# Patient Record
Sex: MALE | Race: WHITE | NOT HISPANIC OR LATINO | Employment: OTHER | URBAN - METROPOLITAN AREA
[De-identification: names, ages, dates, MRNs, and addresses within clinical notes are randomized per-mention and may not be internally consistent; named-entity substitution may affect disease eponyms.]

---

## 2023-04-14 LAB
ALANINE AMINOTRANSFERASE (SGPT) (U/L) IN SER/PLAS: 16 U/L (ref 10–52)
ALBUMIN (G/DL) IN SER/PLAS: 4.3 G/DL (ref 3.4–5)
ALKALINE PHOSPHATASE (U/L) IN SER/PLAS: 42 U/L (ref 33–136)
ANION GAP IN SER/PLAS: 10 MMOL/L (ref 10–20)
ASPARTATE AMINOTRANSFERASE (SGOT) (U/L) IN SER/PLAS: 21 U/L (ref 9–39)
BILIRUBIN TOTAL (MG/DL) IN SER/PLAS: 1.1 MG/DL (ref 0–1.2)
CALCIUM (MG/DL) IN SER/PLAS: 9.4 MG/DL (ref 8.6–10.3)
CARBON DIOXIDE, TOTAL (MMOL/L) IN SER/PLAS: 29 MMOL/L (ref 21–32)
CHLORIDE (MMOL/L) IN SER/PLAS: 104 MMOL/L (ref 98–107)
CHOLESTEROL (MG/DL) IN SER/PLAS: 115 MG/DL (ref 0–199)
CHOLESTEROL IN HDL (MG/DL) IN SER/PLAS: 48.7 MG/DL
CHOLESTEROL/HDL RATIO: 2.4
CREATININE (MG/DL) IN SER/PLAS: 0.99 MG/DL (ref 0.5–1.3)
GFR MALE: 81 ML/MIN/1.73M2
GLUCOSE (MG/DL) IN SER/PLAS: 90 MG/DL (ref 74–99)
LDL: 51 MG/DL (ref 0–99)
POTASSIUM (MMOL/L) IN SER/PLAS: 3.8 MMOL/L (ref 3.5–5.3)
PROTEIN TOTAL: 6.9 G/DL (ref 6.4–8.2)
SODIUM (MMOL/L) IN SER/PLAS: 139 MMOL/L (ref 136–145)
TRIGLYCERIDE (MG/DL) IN SER/PLAS: 79 MG/DL (ref 0–149)
UREA NITROGEN (MG/DL) IN SER/PLAS: 18 MG/DL (ref 6–23)
VLDL: 16 MG/DL (ref 0–40)

## 2024-01-25 PROBLEM — E78.5 DYSLIPIDEMIA (HIGH LDL; LOW HDL): Status: ACTIVE | Noted: 2024-01-25

## 2024-01-25 PROBLEM — R00.0 SINUS TACHYCARDIA: Status: ACTIVE | Noted: 2024-01-25

## 2024-01-25 PROBLEM — I25.10 CAD (CORONARY ARTERY DISEASE), NATIVE CORONARY ARTERY: Status: ACTIVE | Noted: 2024-01-25

## 2024-01-25 PROBLEM — I65.23 BILATERAL CAROTID ARTERY STENOSIS: Status: ACTIVE | Noted: 2024-01-25

## 2024-01-25 PROBLEM — J45.909 ASTHMA (HHS-HCC): Status: ACTIVE | Noted: 2024-01-25

## 2024-01-25 PROBLEM — E78.5 HYPERLIPIDEMIA: Status: ACTIVE | Noted: 2024-01-25

## 2024-01-25 PROBLEM — Z86.79 HISTORY OF VENTRICULAR TACHYCARDIA: Status: ACTIVE | Noted: 2023-05-22

## 2024-01-25 PROBLEM — Z95.1 HISTORY OF CORONARY ARTERY BYPASS SURGERY: Status: ACTIVE | Noted: 2024-01-25

## 2024-01-25 RX ORDER — NAPROXEN SODIUM 220 MG/1
81 TABLET, FILM COATED ORAL DAILY
COMMUNITY

## 2024-01-25 RX ORDER — NITROGLYCERIN 0.4 MG/1
0.4 TABLET SUBLINGUAL EVERY 5 MIN PRN
COMMUNITY
End: 2024-05-02 | Stop reason: SDUPTHER

## 2024-01-25 RX ORDER — MULTIVITAMIN
1 TABLET ORAL DAILY
COMMUNITY
Start: 2021-05-17

## 2024-01-25 RX ORDER — CICLOPIROX 80 MG/ML
1 SOLUTION TOPICAL NIGHTLY
COMMUNITY
Start: 2022-01-19

## 2024-01-25 RX ORDER — ATENOLOL 25 MG/1
12.5 TABLET ORAL DAILY
COMMUNITY
End: 2024-05-02 | Stop reason: SDUPTHER

## 2024-01-25 RX ORDER — LANOLIN ALCOHOL/MO/W.PET/CERES
400 CREAM (GRAM) TOPICAL DAILY
COMMUNITY
Start: 2021-05-17

## 2024-01-25 RX ORDER — KETOCONAZOLE 20 MG/ML
1 SHAMPOO, SUSPENSION TOPICAL
COMMUNITY
Start: 2021-12-31

## 2024-01-25 RX ORDER — ATORVASTATIN CALCIUM 20 MG/1
1 TABLET, FILM COATED ORAL DAILY
COMMUNITY
Start: 2021-05-17 | End: 2024-05-02 | Stop reason: SDUPTHER

## 2024-01-25 RX ORDER — ACETAMINOPHEN 160 MG/5ML
200 SUSPENSION, ORAL (FINAL DOSE FORM) ORAL DAILY
COMMUNITY
Start: 2021-05-17

## 2024-04-23 ENCOUNTER — HOSPITAL ENCOUNTER (OUTPATIENT)
Dept: RADIOLOGY | Facility: CLINIC | Age: 74
Discharge: HOME | End: 2024-04-23
Payer: MEDICARE

## 2024-04-23 ENCOUNTER — HOSPITAL ENCOUNTER (OUTPATIENT)
Dept: CARDIOLOGY | Facility: CLINIC | Age: 74
Discharge: HOME | End: 2024-04-23
Payer: MEDICARE

## 2024-04-23 DIAGNOSIS — I25.10 ATHEROSCLEROTIC HEART DISEASE OF NATIVE CORONARY ARTERY WITHOUT ANGINA PECTORIS: ICD-10-CM

## 2024-04-23 DIAGNOSIS — Z95.1 PRESENCE OF AORTOCORONARY BYPASS GRAFT: ICD-10-CM

## 2024-04-23 PROCEDURE — 3430000001 HC RX 343 DIAGNOSTIC RADIOPHARMACEUTICALS: Performed by: INTERNAL MEDICINE

## 2024-04-23 PROCEDURE — A9502 TC99M TETROFOSMIN: HCPCS | Performed by: INTERNAL MEDICINE

## 2024-04-23 PROCEDURE — 93017 CV STRESS TEST TRACING ONLY: CPT

## 2024-04-23 PROCEDURE — 78452 HT MUSCLE IMAGE SPECT MULT: CPT

## 2024-04-23 RX ORDER — ACETAMINOPHEN 500 MG
2000 TABLET ORAL DAILY
COMMUNITY

## 2024-04-23 RX ADMIN — TETROFOSMIN 36 MILLICURIE: 0.23 INJECTION, POWDER, LYOPHILIZED, FOR SOLUTION INTRAVENOUS at 09:24

## 2024-04-23 RX ADMIN — TETROFOSMIN 10.9 MILLICURIE: 0.23 INJECTION, POWDER, LYOPHILIZED, FOR SOLUTION INTRAVENOUS at 08:02

## 2024-05-02 ENCOUNTER — OFFICE VISIT (OUTPATIENT)
Dept: CARDIOLOGY | Facility: CLINIC | Age: 74
End: 2024-05-02
Payer: MEDICARE

## 2024-05-02 VITALS
WEIGHT: 141.9 LBS | HEIGHT: 70 IN | DIASTOLIC BLOOD PRESSURE: 60 MMHG | HEART RATE: 60 BPM | SYSTOLIC BLOOD PRESSURE: 100 MMHG | BODY MASS INDEX: 20.31 KG/M2

## 2024-05-02 DIAGNOSIS — Z78.9 NEVER SMOKED TOBACCO: ICD-10-CM

## 2024-05-02 DIAGNOSIS — I25.10 CORONARY ARTERY DISEASE INVOLVING NATIVE CORONARY ARTERY OF NATIVE HEART WITHOUT ANGINA PECTORIS: ICD-10-CM

## 2024-05-02 DIAGNOSIS — I65.23 BILATERAL CAROTID ARTERY STENOSIS: ICD-10-CM

## 2024-05-02 DIAGNOSIS — R00.0 SINUS TACHYCARDIA: ICD-10-CM

## 2024-05-02 DIAGNOSIS — E78.5 DYSLIPIDEMIA (HIGH LDL; LOW HDL): ICD-10-CM

## 2024-05-02 DIAGNOSIS — Z95.1 HISTORY OF CORONARY ARTERY BYPASS SURGERY: ICD-10-CM

## 2024-05-02 PROCEDURE — 3008F BODY MASS INDEX DOCD: CPT | Performed by: INTERNAL MEDICINE

## 2024-05-02 PROCEDURE — 1036F TOBACCO NON-USER: CPT | Performed by: INTERNAL MEDICINE

## 2024-05-02 PROCEDURE — 99214 OFFICE O/P EST MOD 30 MIN: CPT | Performed by: INTERNAL MEDICINE

## 2024-05-02 PROCEDURE — 1159F MED LIST DOCD IN RCRD: CPT | Performed by: INTERNAL MEDICINE

## 2024-05-02 PROCEDURE — 1157F ADVNC CARE PLAN IN RCRD: CPT | Performed by: INTERNAL MEDICINE

## 2024-05-02 RX ORDER — NITROGLYCERIN 0.4 MG/1
0.4 TABLET SUBLINGUAL EVERY 5 MIN PRN
Qty: 25 TABLET | Refills: 1 | Status: SHIPPED | OUTPATIENT
Start: 2024-05-02

## 2024-05-02 RX ORDER — ATENOLOL 25 MG/1
12.5 TABLET ORAL DAILY
Qty: 45 TABLET | Refills: 3 | Status: SHIPPED | OUTPATIENT
Start: 2024-05-02 | End: 2025-05-02

## 2024-05-02 RX ORDER — ATORVASTATIN CALCIUM 20 MG/1
20 TABLET, FILM COATED ORAL DAILY
Qty: 90 TABLET | Refills: 3 | Status: SHIPPED | OUTPATIENT
Start: 2024-05-02 | End: 2025-05-02

## 2024-05-02 NOTE — PROGRESS NOTES
CARDIOLOGY OFFICE VISIT      CHIEF COMPLAINT  Chief Complaint   Patient presents with    Follow-up     1 YEAR FOLLOW UP AND TO REVIEW STRESS TESTING RESULTS        HISTORY OF PRESENT ILLNESS   The patient is a 73-year-old  male with past medical history significant for coronary artery disease, status post CABG, 2006, who presents for followup cardiovascular care.     Patient has a rare chest pain described as a thump in his chest or sharp sensation described as mild lasting 1 minute in duration unrelated to activity.  Denies dyspnea exertion.  Occasional palpitations that wakes him from sleep.  He feels an irregular extra beat and then a pause.  Denies nausea, vomiting, dizziness, near-syncope, savita syncope.  Occasional lightheadedness.  Patient exercises 30 minutes doing floor exercises squatting, leg kicks, lunges.  Patient also takes walks and occasionally runs. His home Kardia monitor had a few episodes that suggested possible atrial fibrillation.  I reviewed the rhythm strips and they revealed normal sinus rhythm.          REVIEW OF SYSTEMS: Negative, noncontributory or as previously mentioned x 12 systems.     PAST MEDICAL HISTORY   1. As above plus asthma  2. Seborrhea dermatitis.  3. Tendonitis.  4. Shoulder bone spur.     PAST SURGICAL HISTORY  1. Coronary artery bypass graft x4.  2. Ear surgery in .     SOCIAL HISTORY: Drinks occasionally two to three drinks per week. Denies  tobacco use.     FAMILY HISTORY: Father  at 89 with CVA. Mom alive at 97 with history of  pacemaker implantation, congestive heart failure and pericarditis.     ASSESSMENT:   Coronary artery disease, status post coronary artery bypass graft x4, 2006.  Hyperlipidemia.  History of frequent PVCs, wide-complex tachycardia/ventricular tachycardia versus supraventricular tachycardia with aberrancy during stress test.  Carotid artery stenosis.  Asthma.  Possible Raynaud's.  BPH- [facility]      RECOMMENDATIONS:   The patient appears to be stable from a cardiac standpoint. He has no symptoms of angina. No ischemia on stress test achieving 13.4 METS. I advised the patient if he has recurrent prolonged palpitations to take an additional tablet of magnesium oxide and metoprolol. Use as needed Kardia monitor. Blood pressure is under good control. Lipid profile is within acceptable limits. At this time, we will continue medical therapy as prescribed. Follow up in one year. We will obtain CMP, lipid profile at that time.        Please excuse any errors in grammar or translation related to this dictation. Voice recognition software was utilized to prepare this document    Recent Cardiovascular Testing:  The following results have been reviewed and updated.   MPX: 4/23/24  1. Normal.  2. EF 68%.  3.13.4 METS     CRD: 5/13/2008   1. Mild carotid disease.     Cardiac MRI: 5/12/2008   1. 1+ Tr, 1+ MR  2. EF 61%.   3. No CMR evidence of pericarditis or myocarditis.   4. Delayed enhancement imaging was normal.     Labs: 4/14/24  ,HDL 52, LDL 54, Trig  50     Abd Aortic u/s: 7/10/12  1. Normal abdominal aorta.       VITALS  Vitals:    05/02/24 0843   BP: 100/60   Pulse: 60     Wt Readings from Last 4 Encounters:   05/02/24 64.4 kg (141 lb 14.4 oz)   04/27/23 66.2 kg (146 lb)   04/28/22 66.5 kg (146 lb 9 oz)       PHYSICAL EXAM:  GENERAL:  Well developed, well nourished, in no acute distress.  CHEST:  Symmetric and nontender.  NEURO/PSYCH:  Alert and oriented times three with approppriate behavior and responses.  NECK:  Supple, no JVD, no bruit.  LUNGS:  Clear to auscultation bilaterally, normal respiratory effort.  HEART:  Rate and rhythm REGULAR with no evident murmur, no gallop appreciated.    There are no rubs, clicks or heaves.  EXTREMITIES:  Warm with good color, no clubbing or cyanosis.  There is no edema noted.  PERIPHERAL VASCULAR:  Pulses present and equally palpable; 2+ throughout.    ASSESSMENT AND  PLAN  Problem List Items Addressed This Visit          Cardiac and Vasculature    Bilateral carotid artery stenosis    CAD (coronary artery disease), native coronary artery    Relevant Medications    atenolol (Tenormin) 25 mg tablet    nitroglycerin (Nitrostat) 0.4 mg SL tablet    Other Relevant Orders    Comprehensive metabolic panel    Dyslipidemia (high LDL; low HDL)    Relevant Medications    atorvastatin (Lipitor) 20 mg tablet    Other Relevant Orders    Lipid panel    History of coronary artery bypass surgery    Sinus tachycardia       Endocrine/Metabolic    BMI 20.0-20.9, adult       Tobacco    Never smoked tobacco       Past Medical History  No past medical history on file.    Social History  Social History     Tobacco Use    Smoking status: Never    Smokeless tobacco: Never   Substance Use Topics    Alcohol use: Yes     Comment: 4-5X A WEEK    Drug use: Never       Family History     Family History   Problem Relation Name Age of Onset    Heart failure Mother      Other (Pacemaker insertion) Mother      Other (Pericarditis) Mother      Cardiomyopathy Mother      Hypertension Father      Stroke Father  84    Transient ischemic attack Other          Allergies:  Allergies   Allergen Reactions    Monosodium Glutamate Hives        Outpatient Medications:  Current Outpatient Medications   Medication Instructions    aspirin 81 mg, oral, Daily    atenolol (TENORMIN) 12.5 mg, oral, Daily    atorvastatin (Lipitor) 20 mg tablet 1 tablet, oral, Daily    cholecalciferol (VITAMIN D3) 2,000 Units, oral, Daily    ciclopirox (Penlac) 8 % solution 1 Application, Topical, Nightly    coenzyme Q-10 200 mg, oral, Daily    fish oil concentrate (Omega-3) 120-180 mg capsule 1 capsule, oral, Daily    ketoconazole (NIZOral) 2 % shampoo 1 Application, Topical, As directed    magnesium oxide (MAG-OX) 400 mg, oral, Daily    multivitamin tablet 1 tablet, oral, Daily    nitroglycerin (NITROSTAT) 0.4 mg, sublingual, Every 5 min PRN     "    Recent Lab Results:    CMP:    Lab Results   Component Value Date     04/14/2023    K 3.8 04/14/2023     04/14/2023    CO2 29 04/14/2023    BUN 18 04/14/2023    CREATININE 0.99 04/14/2023    GLUCOSE 90 04/14/2023    CALCIUM 9.4 04/14/2023       Magnesium:    No results found for: \"MG\"    Lipid Profile:    Lab Results   Component Value Date    TRIG 79 04/14/2023    HDL 48.7 04/14/2023       TSH:    No results found for: \"TSH\"    BNP:   No results found for: \"BNP\"     PT/INR:    No results found for: \"PROTIME\", \"INR\"    HgBA1c:    No results found for: \"HGBA1C\"    BMP:  Lab Results   Component Value Date     04/14/2023     04/22/2022     04/15/2021    K 3.8 04/14/2023    K 4.1 04/22/2022    K 4.4 04/15/2021     04/14/2023     04/22/2022     04/15/2021    CO2 29 04/14/2023    CO2 30 04/22/2022    CO2 28 04/15/2021    BUN 18 04/14/2023    BUN 16 04/22/2022    BUN 15 04/15/2021    CREATININE 0.99 04/14/2023    CREATININE 1.02 04/22/2022    CREATININE 0.98 04/15/2021       CBC:  Lab Results   Component Value Date    WBC 4.2 (L) 08/26/2019    RBC 4.29 (L) 08/26/2019    HGB 14.3 08/26/2019    HCT 43.1 08/26/2019     08/26/2019    MCHC 33.2 08/26/2019    RDW 12.6 08/26/2019     08/26/2019       Cardiac Enzymes:    No results found for: \"TROPHS\"    Hepatic Function Panel:    Lab Results   Component Value Date    ALKPHOS 42 04/14/2023    ALT 16 04/14/2023    AST 21 04/14/2023    PROT 6.9 04/14/2023    BILITOT 1.1 04/14/2023           Herb Andrade, DO        "

## 2025-05-08 ENCOUNTER — APPOINTMENT (OUTPATIENT)
Dept: CARDIOLOGY | Facility: CLINIC | Age: 75
End: 2025-05-08
Payer: MEDICARE

## 2025-05-08 VITALS
BODY MASS INDEX: 20.3 KG/M2 | HEIGHT: 71 IN | WEIGHT: 145 LBS | SYSTOLIC BLOOD PRESSURE: 90 MMHG | HEART RATE: 72 BPM | DIASTOLIC BLOOD PRESSURE: 60 MMHG

## 2025-05-08 DIAGNOSIS — I65.23 BILATERAL CAROTID ARTERY STENOSIS: ICD-10-CM

## 2025-05-08 DIAGNOSIS — N40.0 BENIGN PROSTATIC HYPERPLASIA, UNSPECIFIED WHETHER LOWER URINARY TRACT SYMPTOMS PRESENT: ICD-10-CM

## 2025-05-08 DIAGNOSIS — Z78.9 NEVER SMOKED TOBACCO: ICD-10-CM

## 2025-05-08 DIAGNOSIS — E78.5 DYSLIPIDEMIA (HIGH LDL; LOW HDL): Primary | ICD-10-CM

## 2025-05-08 DIAGNOSIS — I25.10 CORONARY ARTERY DISEASE INVOLVING NATIVE CORONARY ARTERY, UNSPECIFIED WHETHER ANGINA PRESENT, UNSPECIFIED WHETHER NATIVE OR TRANSPLANTED HEART: ICD-10-CM

## 2025-05-08 DIAGNOSIS — J45.909 ASTHMA, UNSPECIFIED ASTHMA SEVERITY, UNSPECIFIED WHETHER COMPLICATED, UNSPECIFIED WHETHER PERSISTENT (HHS-HCC): ICD-10-CM

## 2025-05-08 DIAGNOSIS — I25.10 CORONARY ARTERY DISEASE INVOLVING NATIVE CORONARY ARTERY OF NATIVE HEART WITHOUT ANGINA PECTORIS: ICD-10-CM

## 2025-05-08 DIAGNOSIS — I49.3 PVC (PREMATURE VENTRICULAR CONTRACTION): ICD-10-CM

## 2025-05-08 PROCEDURE — 1159F MED LIST DOCD IN RCRD: CPT | Performed by: INTERNAL MEDICINE

## 2025-05-08 PROCEDURE — 1157F ADVNC CARE PLAN IN RCRD: CPT | Performed by: INTERNAL MEDICINE

## 2025-05-08 PROCEDURE — 3008F BODY MASS INDEX DOCD: CPT | Performed by: INTERNAL MEDICINE

## 2025-05-08 PROCEDURE — 1036F TOBACCO NON-USER: CPT | Performed by: INTERNAL MEDICINE

## 2025-05-08 PROCEDURE — 99214 OFFICE O/P EST MOD 30 MIN: CPT | Performed by: INTERNAL MEDICINE

## 2025-05-08 RX ORDER — ATENOLOL 25 MG/1
12.5 TABLET ORAL DAILY
Qty: 45 TABLET | Refills: 3 | Status: SHIPPED | OUTPATIENT
Start: 2025-05-08 | End: 2026-05-08

## 2025-05-08 RX ORDER — ATORVASTATIN CALCIUM 20 MG/1
20 TABLET, FILM COATED ORAL DAILY
Qty: 90 TABLET | Refills: 3 | Status: SHIPPED | OUTPATIENT
Start: 2025-05-08 | End: 2026-05-08

## 2025-05-08 NOTE — PROGRESS NOTES
CARDIOLOGY OFFICE VISIT      CHIEF COMPLAINT  Chief Complaint   Patient presents with    Follow-up     Patient is Present for 1 year follow up for Coronary artery disease involving native coronary artery of native heart without angina pectoris        Hyperlipidemia  PVCs    HISTORY OF PRESENT ILLNESS  The patient is a 74-year-old  male with past medical history significant for coronary artery disease, status post CABG, 2006, who presents for followup cardiovascular care.     Patient reports he has been having issues with BPH.  He has declined surgery and has been concerned about initiating medical therapy prescribed by urology.  He has had no change in his functional status.  Patient has a rare chest pain described as a pressing sensation in his chest or sharp sensation described as mild lasting 1 minute in duration unrelated to activity.  Denies dyspnea exertion, palpitations.  Denies nausea, vomiting, dizziness, near-syncope, savita syncope.  Patient exercises 3 times a week 20-30 minutes.           REVIEW OF SYSTEMS: Negative, noncontributory or as previously mentioned x 12 systems.     PAST MEDICAL HISTORY   1. As above plus asthma  2. Seborrhea dermatitis.  3. Tendonitis.  4. Shoulder bone spur.     PAST SURGICAL HISTORY  1. Coronary artery bypass graft x4.  2. Ear surgery in .     SOCIAL HISTORY: Drinks occasionally two to three drinks per week. Denies  tobacco use.     FAMILY HISTORY: Father  at 89 with CVA. Mom alive at 97 with history of  pacemaker implantation, congestive heart failure and pericarditis.     ASSESSMENT:   Coronary artery disease, status post coronary artery bypass graft x4, 2006.  Hyperlipidemia.  History of frequent PVCs, wide-complex tachycardia/ventricular tachycardia versus supraventricular tachycardia with aberrancy during stress test.  Carotid artery stenosis.  Asthma.  Possible Raynaud's.  BPH- [facility]     RECOMMENDATIONS:   The patient  appears to be stable from a cardiac standpoint. He has no symptoms of angina. No ischemia on stress test achieving 13.4 METS.  No symptomatic arrhythmia.  No evidence of heart failure.  I advised the patient if he has recurrent prolonged palpitations to take an additional tablet of magnesium oxide and metoprolol. Use as needed Kardia monitor. Blood pressure is under good control. Lipid profile is within acceptable limits. At this time, we will continue medical therapy as prescribed. Follow up in one year. We will obtain CMP, lipid profile at that time.        Please excuse any errors in grammar or translation related to this dictation. Voice recognition software was utilized to prepare this document    Recent Cardiovascular Testing:  The following results have been reviewed and updated.   MPX: 4/23/24  1. Normal.  2. EF 68%.  3.13.4 METS     CRD: 5/13/2008   1. Mild carotid disease.     Cardiac MRI: 5/12/2008   1. 1+ Tr, 1+ MR  2. EF 61%.   3. No CMR evidence of pericarditis or myocarditis.   4. Delayed enhancement imaging was normal.     Labs: 4/17/25  , TG 74, HDL 50, LDL 68     Abd Aortic u/s: 7/10/12  1. Normal abdominal aorta.       VITALS  Vitals:    05/08/25 0852   BP: 90/60   Pulse: 72     Wt Readings from Last 4 Encounters:   05/08/25 65.8 kg (145 lb)   05/02/24 64.4 kg (141 lb 14.4 oz)   04/27/23 66.2 kg (146 lb)   04/28/22 66.5 kg (146 lb 9 oz)       PHYSICAL EXAM:  GENERAL:  Well developed, well nourished, in no acute distress.  CHEST:  Symmetric and nontender.  NEURO/PSYCH:  Alert and oriented times three with approppriate behavior and responses.  NECK:  Supple, no JVD, no bruit.  LUNGS:  Clear to auscultation bilaterally, normal respiratory effort.  HEART:  Rate and rhythm REGULAR with no evident murmur, no gallop appreciated.    There are no rubs, clicks or heaves.  EXTREMITIES:  Warm with good color, no clubbing or cyanosis.  There is no edema noted.  PERIPHERAL VASCULAR:  Pulses present and  equally palpable; 2+ throughout.    ASSESSMENT AND PLAN  Diagnoses and all orders for this visit:  Dyslipidemia (high LDL; low HDL)  -     atorvastatin (Lipitor) 20 mg tablet; Take 1 tablet (20 mg) by mouth once daily.  -     Follow Up In Cardiology; Future  -     Lipid panel; Future  Coronary artery disease involving native coronary artery, unspecified whether angina present, unspecified whether native or transplanted heart  -     Follow Up In Cardiology; Future  -     Comprehensive metabolic panel; Future  Bilateral carotid artery stenosis  -     Follow Up In Cardiology; Future  Asthma, unspecified asthma severity, unspecified whether complicated, unspecified whether persistent ([facility])  -     Follow Up In Cardiology; Future  PVC (premature ventricular contraction)  -     Follow Up In Cardiology; Future  Benign prostatic hyperplasia, unspecified whether lower urinary tract symptoms present  -     Follow Up In Cardiology; Future  Never smoked tobacco  -     Follow Up In Cardiology; Future  BMI 20.0-20.9, adult  -     Follow Up In Cardiology; Future  Coronary artery disease involving native coronary artery of native heart without angina pectoris  -     atenolol (Tenormin) 25 mg tablet; Take 0.5 tablets (12.5 mg) by mouth once daily.  -     Follow Up In Cardiology; Future      Past Medical History  Medical History[1]    Social History  Social History[2]    Family History   Family History[3]     Allergies:  RX Allergies[4]     Outpatient Medications:  Current Outpatient Medications   Medication Instructions    aspirin 81 mg, Daily    atenolol (TENORMIN) 12.5 mg, oral, Daily    atorvastatin (LIPITOR) 20 mg, oral, Daily    cholecalciferol (VITAMIN D3) 2,000 Units, Daily    ciclopirox (Penlac) 8 % solution 1 Application, Nightly    coenzyme Q-10 200 mg, Daily    fish oil concentrate (Omega-3) 120-180 mg capsule 1 capsule, Daily    ketoconazole (NIZOral) 2 % shampoo 1 Application    magnesium oxide (MAG-OX) 400 mg, Daily  "   multivitamin tablet 1 tablet, Daily    nitroglycerin (NITROSTAT) 0.4 mg, sublingual, Every 5 min PRN        Recent Lab Results:    CMP:    Lab Results   Component Value Date     04/14/2023    K 3.8 04/14/2023     04/14/2023    CO2 29 04/14/2023    BUN 18 04/14/2023    CREATININE 0.99 04/14/2023    GLUCOSE 90 04/14/2023    CALCIUM 9.4 04/14/2023       Magnesium:    No results found for: \"MG\"    Lipid Profile:    Lab Results   Component Value Date    TRIG 79 04/14/2023    HDL 48.7 04/14/2023       TSH:    No results found for: \"TSH\"    BNP:   No results found for: \"BNP\"     PT/INR:    No results found for: \"PROTIME\", \"INR\"    HgBA1c:    No results found for: \"HGBA1C\"    BMP:  Lab Results   Component Value Date     04/14/2023     04/22/2022     04/15/2021    K 3.8 04/14/2023    K 4.1 04/22/2022    K 4.4 04/15/2021     04/14/2023     04/22/2022     04/15/2021    CO2 29 04/14/2023    CO2 30 04/22/2022    CO2 28 04/15/2021    BUN 18 04/14/2023    BUN 16 04/22/2022    BUN 15 04/15/2021    CREATININE 0.99 04/14/2023    CREATININE 1.02 04/22/2022    CREATININE 0.98 04/15/2021       CBC:  Lab Results   Component Value Date    WBC 4.2 (L) 08/26/2019    RBC 4.29 (L) 08/26/2019    HGB 14.3 08/26/2019    HCT 43.1 08/26/2019     08/26/2019    MCHC 33.2 08/26/2019    RDW 12.6 08/26/2019     08/26/2019       Cardiac Enzymes:    No results found for: \"TROPHS\"    Hepatic Function Panel:    Lab Results   Component Value Date    ALKPHOS 42 04/14/2023    ALT 16 04/14/2023    AST 21 04/14/2023    PROT 6.9 04/14/2023    BILITOT 1.1 04/14/2023         IYisel LPN am scribing for, and in the presence of Dr. Herb Andrade DO, FAC.    I, Dr. Herb Andrade DO, [facility], personally performed the services described in the documentation as scribed by Yisel Person LPN in my presence, and confirm it is both accurate and complete.    Dr. Herb Andrade DO  Thank you " for allowing me to participate in the care of this patient. Please do not hesitate to contact me with any further questions or concerns.             [1] History reviewed. No pertinent past medical history.  [2]   Social History  Tobacco Use    Smoking status: Never    Smokeless tobacco: Never   Substance Use Topics    Alcohol use: Yes     Comment: 4-5X A WEEK    Drug use: Never   [3]   Family History  Problem Relation Name Age of Onset    Heart failure Mother      Other (Pacemaker insertion) Mother      Other (Pericarditis) Mother      Cardiomyopathy Mother      Hypertension Father      Stroke Father  84    Aortic aneurysm Sister      Transient ischemic attack Other     [4]   Allergies  Allergen Reactions    Monosodium Glutamate Hives

## 2025-05-08 NOTE — PATIENT INSTRUCTIONS
Continue same medications and treatments.   Patient educated on proper medication use.   Patient educated on risk factor modification.   Please bring any lab results from other providers / physicians to your next appointment.     Please bring all medicines, vitamins, and herbal supplements with you when you come to the office.     Prescriptions will not be filled unless you are compliant with your follow up appointments or have a follow up appointment scheduled as per instruction of your physician. Refills should be requested at the time of your visit.    OBTAIN FASTING LAB WORK IN 1 YEAR PRIOR TO YOUR OFFICE VISIT    FOLLOW UP IN 1 YEAR    IYisel LPN, am scribing for and in the presence of Dr. Herb Andrade, DO, FACC

## 2026-05-07 ENCOUNTER — APPOINTMENT (OUTPATIENT)
Dept: CARDIOLOGY | Facility: CLINIC | Age: 76
End: 2026-05-07
Payer: MEDICARE